# Patient Record
Sex: FEMALE | Race: WHITE | URBAN - METROPOLITAN AREA
[De-identification: names, ages, dates, MRNs, and addresses within clinical notes are randomized per-mention and may not be internally consistent; named-entity substitution may affect disease eponyms.]

---

## 2017-06-26 ENCOUNTER — OFFICE VISIT (OUTPATIENT)
Dept: CARDIOLOGY CLINIC | Age: 27
End: 2017-06-26

## 2017-06-26 ENCOUNTER — TELEPHONE (OUTPATIENT)
Dept: CARDIOLOGY CLINIC | Age: 27
End: 2017-06-26

## 2017-06-26 VITALS
OXYGEN SATURATION: 98 % | WEIGHT: 120 LBS | SYSTOLIC BLOOD PRESSURE: 104 MMHG | HEART RATE: 92 BPM | BODY MASS INDEX: 21.26 KG/M2 | DIASTOLIC BLOOD PRESSURE: 64 MMHG | HEIGHT: 63 IN

## 2017-06-26 DIAGNOSIS — R07.9 CHEST PAIN AT REST: ICD-10-CM

## 2017-06-26 DIAGNOSIS — R00.2 INTERMITTENT PALPITATIONS: Primary | ICD-10-CM

## 2017-06-26 NOTE — PROGRESS NOTES
HISTORY OF PRESENT ILLNESS  Elizabeth Malik is a 32 y.o. female. She is referred for evaluation of palpitations and atypical chest pain. She is an [employer]. She does not smoke cigarettes or drink alcohol. She is able to dance and walk without any problems with regard to chest pain. She has had heartburn in the past.  When she went to [facility], she had blood work that was normal.  A thyroid level was also drawn and is pending. She has been having palpitations for approximately ten days occurring almost every day. She also has occasional chest pain at rest going to her left arm. She has no other significant past medical history. She works in marketing. HPI     Patient Active Problem List   Diagnosis Code    Intermittent palpitations R00.2    Chest pain at rest R07.9       History reviewed. No pertinent past medical history. History reviewed. No pertinent surgical history. Review of Systems   Cardiovascular: Positive for chest pain and palpitations. All other systems reviewed and are negative. Visit Vitals    /64 (BP 1 Location: Left arm, BP Patient Position: Sitting)    Pulse 92    Ht 5' 3\" (1.6 m)    Wt 120 lb (54.4 kg)    SpO2 98%    BMI 21.26 kg/m2       Physical Exam   Constitutional: She is oriented to person, place, and time. She appears well-nourished. HENT:   Head: Atraumatic. Eyes: Conjunctivae are normal.   Neck: Neck supple. Cardiovascular: Normal rate and regular rhythm. Exam reveals no gallop and no friction rub. No murmur heard. Pulmonary/Chest: Breath sounds normal. She has no wheezes. Abdominal: Bowel sounds are normal.   Musculoskeletal: She exhibits no edema. Neurological: She is oriented to person, place, and time. Skin: Skin is dry. Psychiatric: Her behavior is normal.   Nursing note and vitals reviewed. ASSESSMENT and PLAN  She is quite concerned about her symptoms.   Given her young age and her ability to dance, I do not think she has any significant underlying cardiac disease. Nevertheless, I will send her an event monitor and will have her come back in about one month and do an echocardiogram at that time.

## 2017-06-26 NOTE — MR AVS SNAPSHOT
Visit Information Date & Time Provider Department Dept. Phone Encounter #  
 6/26/2017  2:40 PM Vi Alvarez MD [facility] Shawn Arevalo [phone] [id number] Upcoming Health Maintenance Date Due DTaP/Tdap/Td series (1 - Tdap) 1/1/2009 PAP AKA CERVICAL CYTOLOGY 1/1/2009 INFLUENZA AGE 9 TO ADULT 8/1/2017 Allergies as of 6/26/2017  Review Complete On: 6/26/2017 By: Rosy Talavera LPN Severity Noted Reaction Type Reactions Latex  06/26/2017    Rash Avelox [Moxifloxacin]  06/26/2017    Hives Ceclor [Cefaclor]  06/26/2017    Hives Ciprofloxacin  06/26/2017    Hives Macrobid [Nitrofurantoin Monohyd/m-cryst]  06/26/2017    Hives Pcn [Penicillins]  06/26/2017    Unknown (comments) Prednisone  06/26/2017    Other (comments)  
 emotional  
  
Current Immunizations  Never Reviewed No immunizations on file. Not reviewed this visit Vitals BP Pulse Height(growth percentile) Weight(growth percentile) SpO2 BMI  
 104/64 (BP 1 Location: Left arm, BP Patient Position: Sitting) 92 5' 3\" (1.6 m) 120 lb (54.4 kg) 98% 21.26 kg/m2 Smoking Status Never Smoker Vitals History BMI and BSA Data Body Mass Index Body Surface Area  
 21.26 kg/m 2 1.56 m 2 Preferred Pharmacy Pharmacy Name Phone [phone] [facility] [phone] Your Updated Medication List  
  
Notice  As of 6/26/2017  3:30 PM  
 You have not been prescribed any medications. Introducing [facility]! [facility] introduces [employer] patient portal. Now you can access parts of your medical record, email your doctor's office, and request medication refills online. 1. In your internet browser, go to [email or web address] Lua/[employer] 2. Click on the First Time User? Click Here link in the Sign In box.  You will see the New Member Sign Up page. 3. Enter your FunCaptcha Access Code exactly as it appears below. You will not need to use this code after youve completed the sign-up process. If you do not sign up before the expiration date, you must request a new code. · FunCaptcha Access Code: [email or web address] Expires: 9/24/2017  3:27 PM 
 
4. Enter the last four digits of your Social Security Number (xxxx) and Date of Birth (mm/dd/yyyy) as indicated and click Submit. You will be taken to the next sign-up page. 5. Create a [employer] ID. This will be your FunCaptcha login ID and cannot be changed, so think of one that is secure and easy to remember. 6. Create a FunCaptcha password. You can change your password at any time. 7. Enter your Password Reset Question and Answer. This can be used at a later time if you forget your password. 8. Enter your e-mail address. You will receive e-mail notification when new information is available in [address] 9. Click Sign Up. You can now view and download portions of your medical record. 10. Click the Download Summary menu link to download a portable copy of your medical information. If you have questions, please visit the Frequently Asked Questions section of the FunCaptcha website. Remember, FunCaptcha is NOT to be used for urgent needs. For medical emergencies, dial 911. Now available from your iPhone and Android! Please provide this summary of care documentation to your next provider. If you have any questions after today's visit, please call [phone].

## 2017-06-26 NOTE — TELEPHONE ENCOUNTER
Dr. Rebecca Cobb would like patient to have an event monitor mailed to her. Thanks. Well hold off for now.  She's checking billing

## 2017-06-27 ENCOUNTER — TELEPHONE (OUTPATIENT)
Dept: CARDIOLOGY CLINIC | Age: 27
End: 2017-06-27

## 2017-06-27 ENCOUNTER — CLINICAL SUPPORT (OUTPATIENT)
Dept: CARDIOLOGY CLINIC | Age: 27
End: 2017-06-27

## 2017-06-27 DIAGNOSIS — R00.2 PALPITATIONS: Primary | ICD-10-CM

## 2017-06-27 DIAGNOSIS — I34.0 NONRHEUMATIC MITRAL (VALVE) INSUFFICIENCY: ICD-10-CM

## 2017-06-27 NOTE — TELEPHONE ENCOUNTER
Per Dr Ambar Guerrero echo \"normal. Mild MR. No big deal.\"  Spoke to patient. She will wait for the event monitor approval from insurance.

## 2018-02-01 ENCOUNTER — TELEPHONE (OUTPATIENT)
Dept: CARDIOLOGY CLINIC | Age: 28
End: 2018-02-01

## 2018-02-13 ENCOUNTER — CLINICAL SUPPORT (OUTPATIENT)
Dept: CARDIOLOGY CLINIC | Age: 28
End: 2018-02-13

## 2018-02-13 DIAGNOSIS — R00.2 PALPITATIONS: Primary | ICD-10-CM

## 2018-02-13 NOTE — TELEPHONE ENCOUNTER
2 identifiers. Spoke to patient. She spoke to Tawnya. She would like us to order a 30 day MCT loop monitor. Mail to [place] [address]. Her new insurance is [employer] [id number].

## 2018-02-13 NOTE — TELEPHONE ENCOUNTER
Pt stated she has new insurance and she would like to make sure they would cover the cost of a loop monitor Dr. Lisa Carlton had wanted to do. Pt has [employer]. Pt can be reached at [phone].     Thank you,  Angelica Carranza

## 2018-02-13 NOTE — TELEPHONE ENCOUNTER
LVM to return call. She will need to call [employer] to find out if they accept [employer]. [phone].

## 2018-03-05 ENCOUNTER — OFFICE (OUTPATIENT)
Dept: URBAN - METROPOLITAN AREA CLINIC 11 | Facility: CLINIC | Age: 28
End: 2018-03-05

## 2018-03-05 VITALS
DIASTOLIC BLOOD PRESSURE: 76 MMHG | HEIGHT: 64 IN | WEIGHT: 132 LBS | SYSTOLIC BLOOD PRESSURE: 127 MMHG | HEART RATE: 78 BPM

## 2018-03-05 DIAGNOSIS — R19.7 DIARRHEA, UNSPECIFIED: ICD-10-CM

## 2018-03-05 DIAGNOSIS — R10.9 UNSPECIFIED ABDOMINAL PAIN: ICD-10-CM

## 2018-03-05 LAB
GASTRIN, SERUM: 22 PG/ML (ref 0–115)
VIP, PLASMA: 19.7 PG/ML (ref 0–58.8)

## 2018-03-05 PROCEDURE — 99203 OFFICE O/P NEW LOW 30 MIN: CPT | Performed by: INTERNAL MEDICINE

## 2018-03-06 ENCOUNTER — TELEPHONE (OUTPATIENT)
Dept: CARDIOLOGY CLINIC | Age: 28
End: 2018-03-06

## 2018-03-06 NOTE — TELEPHONE ENCOUNTER
Patient said she received a call that she had an event lastnight and was told to call here   Phone: [phone]

## 2018-03-06 NOTE — TELEPHONE ENCOUNTER
Itzel Casas called patient. She did not answer. He said there was nothing urgent found on recordings. No message left. Will try her back later.

## 2018-03-09 NOTE — TELEPHONE ENCOUNTER
Patient called back. Verified patient's identity with two identifiers. Patient asked why Tawnya called her in the middle of the night. Bryan Faye checked. Monitor showed sinus rhythm and PACs. I told this to patient. She states she still has not felt the extreme palpitations while she has been wearing the monitor, but will continue to wear it. Discussed signs and symptoms qualifying urgent care or call to office. Patient verbalizes understanding and denies further questions or concerns.

## 2018-04-06 ENCOUNTER — TELEPHONE (OUTPATIENT)
Dept: CARDIOLOGY CLINIC | Age: 28
End: 2018-04-06

## 2018-04-06 NOTE — TELEPHONE ENCOUNTER
Received loop monitor results noted baseline sinus rhythm with a heart reate of 90 bpm.  Per service report pt had 37 stable events and all are noted on the report. Summary of findings indicated pt had occasional sinus tachycardia and PAC's. Would you prefer pt make a f/u visit to review results or would you like the nurse to call with results via phone encounter.

## 2018-04-09 NOTE — TELEPHONE ENCOUNTER
Called patient with normal loop results. Has questions regarding episodes. Explained the normal fluctuations in heart rhythm and that PACS were common. Requested to have MD call. Discussed with Dr. Rose Fonseca. Follow up visit suggested . [place]. Mychart pending. Called and spoke to patient. Inquired to specific questions to address. Unable to provide. Would like to have copy of loop results mailed to her . Informed her that I would mail  Authorization to [address] [place] [address].

## 2018-04-13 LAB
5-HIAA,QUANT.,24 HR URINE: 5-HIAA, URINE, 24HR: 3.1 MG/24 HR (ref 0–14.9)
5-HIAA,QUANT.,24 HR URINE: 5-HIAA, URINE: 6.1 MG/L

## 2018-05-02 ENCOUNTER — OFFICE (OUTPATIENT)
Dept: URBAN - METROPOLITAN AREA CLINIC 11 | Facility: CLINIC | Age: 28
End: 2018-05-02

## 2018-05-02 DIAGNOSIS — R19.7 DIARRHEA, UNSPECIFIED: ICD-10-CM

## 2018-05-02 DIAGNOSIS — R10.9 UNSPECIFIED ABDOMINAL PAIN: ICD-10-CM

## 2018-05-02 PROCEDURE — 91065 BREATH HYDROGEN/METHANE TEST: CPT | Performed by: INTERNAL MEDICINE

## 2018-05-04 ENCOUNTER — TELEPHONE (OUTPATIENT)
Dept: CARDIOLOGY CLINIC | Age: 28
End: 2018-05-04

## 2018-05-04 NOTE — TELEPHONE ENCOUNTER
Patient called requesting loop monitor report be faxed go her. She gave me temporary fax #. Faxed report. Patient verbalizes understanding and denies further questions or concerns.

## 2018-08-14 ENCOUNTER — OFFICE (OUTPATIENT)
Dept: URBAN - METROPOLITAN AREA CLINIC 14 | Facility: CLINIC | Age: 28
End: 2018-08-14

## 2018-08-14 DIAGNOSIS — R19.7 DIARRHEA, UNSPECIFIED: ICD-10-CM

## 2018-08-14 PROCEDURE — 91065 BREATH HYDROGEN/METHANE TEST: CPT | Performed by: INTERNAL MEDICINE

## 2018-12-11 ENCOUNTER — OFFICE (OUTPATIENT)
Dept: URBAN - METROPOLITAN AREA CLINIC 11 | Facility: CLINIC | Age: 28
End: 2018-12-11

## 2018-12-11 VITALS
DIASTOLIC BLOOD PRESSURE: 76 MMHG | HEIGHT: 62 IN | SYSTOLIC BLOOD PRESSURE: 133 MMHG | WEIGHT: 136 LBS | HEART RATE: 67 BPM

## 2018-12-11 DIAGNOSIS — E53.0 RIBOFLAVIN DEFICIENCY: ICD-10-CM

## 2018-12-11 DIAGNOSIS — E51.9 THIAMINE DEFICIENCY, UNSPECIFIED: ICD-10-CM

## 2018-12-11 DIAGNOSIS — R19.7 DIARRHEA, UNSPECIFIED: ICD-10-CM

## 2018-12-11 DIAGNOSIS — N92.0 EXCESSIVE AND FREQUENT MENSTRUATION WITH REGULAR CYCLE: ICD-10-CM

## 2018-12-11 DIAGNOSIS — R89.1: ICD-10-CM

## 2018-12-11 DIAGNOSIS — E55.9 VITAMIN D DEFICIENCY, UNSPECIFIED: ICD-10-CM

## 2018-12-11 DIAGNOSIS — R53.82 CHRONIC FATIGUE, UNSPECIFIED: ICD-10-CM

## 2018-12-11 DIAGNOSIS — E72.20 DISORDER OF UREA CYCLE METABOLISM, UNSPECIFIED: ICD-10-CM

## 2018-12-11 PROCEDURE — 99212 OFFICE O/P EST SF 10 MIN: CPT | Performed by: INTERNAL MEDICINE

## 2018-12-12 ENCOUNTER — OFFICE (OUTPATIENT)
Dept: URBAN - METROPOLITAN AREA CLINIC 14 | Facility: CLINIC | Age: 28
End: 2018-12-12

## 2018-12-12 DIAGNOSIS — R19.7 DIARRHEA, UNSPECIFIED: ICD-10-CM

## 2018-12-12 PROCEDURE — 91065 BREATH HYDROGEN/METHANE TEST: CPT | Performed by: INTERNAL MEDICINE

## 2019-10-24 ENCOUNTER — OFFICE (OUTPATIENT)
Dept: URBAN - METROPOLITAN AREA CLINIC 11 | Facility: CLINIC | Age: 29
End: 2019-10-24

## 2019-10-24 VITALS
WEIGHT: 132 LBS | SYSTOLIC BLOOD PRESSURE: 125 MMHG | HEART RATE: 62 BPM | HEIGHT: 62 IN | DIASTOLIC BLOOD PRESSURE: 65 MMHG

## 2019-10-24 DIAGNOSIS — R19.7 DIARRHEA, UNSPECIFIED: ICD-10-CM

## 2019-10-24 PROCEDURE — 99213 OFFICE O/P EST LOW 20 MIN: CPT | Performed by: INTERNAL MEDICINE

## 2019-10-28 LAB
C-REACTIVE PROTEIN, QUANT: 7 MG/L (ref 0–10)
COMP. METABOLIC PANEL (14): A/G RATIO: 1.6 (ref 1.2–2.2)
COMP. METABOLIC PANEL (14): ALBUMIN: 4.6 G/DL (ref 3.5–5.5)
COMP. METABOLIC PANEL (14): ALKALINE PHOSPHATASE: 88 IU/L (ref 39–117)
COMP. METABOLIC PANEL (14): ALT (SGPT): 11 IU/L (ref 0–32)
COMP. METABOLIC PANEL (14): AST (SGOT): 19 IU/L (ref 0–40)
COMP. METABOLIC PANEL (14): BILIRUBIN, TOTAL: 0.4 MG/DL (ref 0–1.2)
COMP. METABOLIC PANEL (14): BUN/CREATININE RATIO: 9 (ref 9–23)
COMP. METABOLIC PANEL (14): BUN: 8 MG/DL (ref 6–20)
COMP. METABOLIC PANEL (14): CALCIUM: 9.1 MG/DL (ref 8.7–10.2)
COMP. METABOLIC PANEL (14): CARBON DIOXIDE, TOTAL: 21 MMOL/L (ref 20–29)
COMP. METABOLIC PANEL (14): CHLORIDE: 104 MMOL/L (ref 96–106)
COMP. METABOLIC PANEL (14): CREATININE: 0.86 MG/DL (ref 0.57–1)
COMP. METABOLIC PANEL (14): EGFR IF AFRICN AM: 104 ML/MIN/1.73 (ref 59–?)
COMP. METABOLIC PANEL (14): EGFR IF NONAFRICN AM: 90 ML/MIN/1.73 (ref 59–?)
COMP. METABOLIC PANEL (14): GLOBULIN, TOTAL: 2.8 G/DL (ref 1.5–4.5)
COMP. METABOLIC PANEL (14): GLUCOSE: 77 MG/DL (ref 65–99)
COMP. METABOLIC PANEL (14): POTASSIUM: 4.2 MMOL/L (ref 3.5–5.2)
COMP. METABOLIC PANEL (14): PROTEIN, TOTAL: 7.4 G/DL (ref 6–8.5)
COMP. METABOLIC PANEL (14): SODIUM: 138 MMOL/L (ref 134–144)
Lab: 3.4 UG/L (ref 2–32.8)
SEDIMENTATION RATE-WESTERGREN: 24 MM/HR (ref 0–32)
VITAMIN B1 (THIAMINE), BLOOD: VIT. B1, WHOLE BLOOD: 108.3 NMOL/L (ref 66.5–200)
VITAMIN B12 AND FOLATE: FOLATE (FOLIC ACID), SERUM: 16.4 NG/ML (ref 3–?)
VITAMIN B12 AND FOLATE: VITAMIN B12: 556 PG/ML (ref 232–1245)
VITAMIN B2, WHOLE BLOOD: 157 UG/L (ref 137–370)
VITAMIN D, 25-HYDROXY: 35.9 NG/ML (ref 30–100)

## 2021-10-29 ENCOUNTER — OFFICE (OUTPATIENT)
Dept: URBAN - METROPOLITAN AREA CLINIC 27 | Facility: CLINIC | Age: 31
Setting detail: OPHTHALMOLOGY
End: 2021-10-29
Payer: COMMERCIAL

## 2021-10-29 DIAGNOSIS — H10.9: ICD-10-CM

## 2021-10-29 PROCEDURE — 92002 INTRM OPH EXAM NEW PATIENT: CPT | Performed by: OPHTHALMOLOGY

## 2021-10-29 ASSESSMENT — KERATOMETRY
OS_K1POWER_DIOPTERS: DEFER
OD_K1POWER_DIOPTERS: DEFER

## 2021-10-29 ASSESSMENT — REFRACTION_CURRENTRX
OD_AXIS: 177
OS_OVR_VA: 20/
OS_SPHERE: -1.50
OD_SPHERE: -1.50
OD_CYLINDER: +0.25
OS_CYLINDER: +0.25
OS_AXIS: 145
OD_OVR_VA: 20/

## 2021-10-29 ASSESSMENT — REFRACTION_AUTOREFRACTION
OD_SPHERE: DEFER
OS_SPHERE: DEFER

## 2021-10-29 ASSESSMENT — VISUAL ACUITY
OS_BCVA: 20/20
OD_BCVA: 20/20